# Patient Record
Sex: FEMALE | Race: ASIAN | NOT HISPANIC OR LATINO
[De-identification: names, ages, dates, MRNs, and addresses within clinical notes are randomized per-mention and may not be internally consistent; named-entity substitution may affect disease eponyms.]

---

## 2017-10-30 PROBLEM — Z00.00 ENCOUNTER FOR PREVENTIVE HEALTH EXAMINATION: Status: ACTIVE | Noted: 2017-10-30

## 2017-11-02 ENCOUNTER — APPOINTMENT (OUTPATIENT)
Dept: DERMATOLOGY | Facility: CLINIC | Age: 42
End: 2017-11-02
Payer: COMMERCIAL

## 2017-11-02 VITALS
DIASTOLIC BLOOD PRESSURE: 81 MMHG | BODY MASS INDEX: 28.68 KG/M2 | HEIGHT: 64 IN | SYSTOLIC BLOOD PRESSURE: 114 MMHG | TEMPERATURE: 98.1 F | HEART RATE: 57 BPM | OXYGEN SATURATION: 100 % | WEIGHT: 168 LBS

## 2017-11-02 DIAGNOSIS — Z91.14 PATIENT'S OTHER NONCOMPLIANCE WITH MEDICATION REGIMEN: ICD-10-CM

## 2017-11-02 DIAGNOSIS — L70.0 ACNE VULGARIS: ICD-10-CM

## 2017-11-02 DIAGNOSIS — I78.1 NEVUS, NON-NEOPLASTIC: ICD-10-CM

## 2017-11-02 PROCEDURE — 99203 OFFICE O/P NEW LOW 30 MIN: CPT

## 2017-11-02 RX ORDER — BENZOYL PEROXIDE 5 G/100G
5 LIQUID TOPICAL
Qty: 1 | Refills: 11 | Status: ACTIVE | COMMUNITY
Start: 2017-11-02 | End: 1900-01-01

## 2017-11-02 RX ORDER — CLINDAMYCIN PHOSPHATE 10 MG/ML
1 LOTION TOPICAL
Qty: 1 | Refills: 3 | Status: ACTIVE | COMMUNITY
Start: 2017-11-02 | End: 1900-01-01

## 2017-11-02 RX ORDER — TRETINOIN 0.25 MG/G
0.03 CREAM TOPICAL
Qty: 1 | Refills: 2 | Status: ACTIVE | COMMUNITY
Start: 2017-11-02 | End: 1900-01-01

## 2018-01-04 ENCOUNTER — APPOINTMENT (OUTPATIENT)
Dept: DERMATOLOGY | Facility: CLINIC | Age: 43
End: 2018-01-04

## 2020-04-26 ENCOUNTER — MESSAGE (OUTPATIENT)
Age: 45
End: 2020-04-26

## 2024-03-26 ENCOUNTER — EMERGENCY (EMERGENCY)
Facility: HOSPITAL | Age: 49
LOS: 1 days | Discharge: HOME | End: 2024-03-26
Attending: EMERGENCY MEDICINE | Admitting: STUDENT IN AN ORGANIZED HEALTH CARE EDUCATION/TRAINING PROGRAM
Payer: COMMERCIAL

## 2024-03-26 VITALS
SYSTOLIC BLOOD PRESSURE: 130 MMHG | HEART RATE: 82 BPM | TEMPERATURE: 98 F | RESPIRATION RATE: 16 BRPM | DIASTOLIC BLOOD PRESSURE: 89 MMHG | OXYGEN SATURATION: 98 %

## 2024-03-26 PROBLEM — M25.552 LEFT HIP PAIN: Status: ACTIVE | Noted: 2024-03-26

## 2024-03-26 PROCEDURE — 99223 1ST HOSP IP/OBS HIGH 75: CPT

## 2024-03-26 PROCEDURE — 73502 X-RAY EXAM HIP UNI 2-3 VIEWS: CPT | Mod: 26,LT

## 2024-03-26 RX ORDER — ACETAMINOPHEN 500 MG
325 TABLET ORAL ONCE
Refills: 0 | Status: COMPLETED | OUTPATIENT
Start: 2024-03-26 | End: 2024-03-26

## 2024-03-26 RX ORDER — LIDOCAINE 4 G/100G
1 CREAM TOPICAL ONCE
Refills: 0 | Status: COMPLETED | OUTPATIENT
Start: 2024-03-26 | End: 2024-03-26

## 2024-03-26 RX ORDER — AMLODIPINE BESYLATE 2.5 MG/1
5 TABLET ORAL DAILY
Refills: 0 | Status: DISCONTINUED | OUTPATIENT
Start: 2024-03-26 | End: 2024-03-29

## 2024-03-26 RX ORDER — LIDOCAINE 4 G/100G
1 CREAM TOPICAL DAILY
Refills: 0 | Status: DISCONTINUED | OUTPATIENT
Start: 2024-03-26 | End: 2024-03-29

## 2024-03-26 RX ORDER — DIAZEPAM 5 MG
5 TABLET ORAL ONCE
Refills: 0 | Status: DISCONTINUED | OUTPATIENT
Start: 2024-03-26 | End: 2024-03-26

## 2024-03-26 RX ORDER — OXYCODONE AND ACETAMINOPHEN 5; 325 MG/1; MG/1
2 TABLET ORAL ONCE
Refills: 0 | Status: DISCONTINUED | OUTPATIENT
Start: 2024-03-26 | End: 2024-03-26

## 2024-03-26 RX ORDER — ACETAMINOPHEN 500 MG
650 TABLET ORAL ONCE
Refills: 0 | Status: COMPLETED | OUTPATIENT
Start: 2024-03-26 | End: 2024-03-26

## 2024-03-26 RX ORDER — ACETAMINOPHEN 500 MG
975 TABLET ORAL EVERY 6 HOURS
Refills: 0 | Status: DISCONTINUED | OUTPATIENT
Start: 2024-03-26 | End: 2024-03-29

## 2024-03-26 RX ORDER — MORPHINE SULFATE 50 MG/1
4 CAPSULE, EXTENDED RELEASE ORAL ONCE
Refills: 0 | Status: DISCONTINUED | OUTPATIENT
Start: 2024-03-26 | End: 2024-03-26

## 2024-03-26 RX ORDER — OXYCODONE HYDROCHLORIDE 5 MG/1
5 TABLET ORAL EVERY 6 HOURS
Refills: 0 | Status: DISCONTINUED | OUTPATIENT
Start: 2024-03-26 | End: 2024-03-27

## 2024-03-26 RX ADMIN — OXYCODONE HYDROCHLORIDE 5 MILLIGRAM(S): 5 TABLET ORAL at 16:13

## 2024-03-26 RX ADMIN — LIDOCAINE 1 PATCH: 4 CREAM TOPICAL at 19:00

## 2024-03-26 RX ADMIN — Medication 650 MILLIGRAM(S): at 10:07

## 2024-03-26 RX ADMIN — Medication 650 MILLIGRAM(S): at 11:07

## 2024-03-26 RX ADMIN — Medication 325 MILLIGRAM(S): at 10:07

## 2024-03-26 RX ADMIN — LIDOCAINE 1 PATCH: 4 CREAM TOPICAL at 12:01

## 2024-03-26 RX ADMIN — OXYCODONE HYDROCHLORIDE 5 MILLIGRAM(S): 5 TABLET ORAL at 17:39

## 2024-03-26 RX ADMIN — MORPHINE SULFATE 4 MILLIGRAM(S): 50 CAPSULE, EXTENDED RELEASE ORAL at 11:18

## 2024-03-26 RX ADMIN — MORPHINE SULFATE 4 MILLIGRAM(S): 50 CAPSULE, EXTENDED RELEASE ORAL at 12:00

## 2024-03-26 RX ADMIN — MORPHINE SULFATE 4 MILLIGRAM(S): 50 CAPSULE, EXTENDED RELEASE ORAL at 09:29

## 2024-03-26 RX ADMIN — Medication 325 MILLIGRAM(S): at 11:07

## 2024-03-26 RX ADMIN — Medication 5 MILLIGRAM(S): at 19:21

## 2024-03-26 NOTE — ED ADULT NURSE NOTE - CHIEF COMPLAINT QUOTE
patient states " I am having pain in my left hip since last Wednesday and I was told I have pinched nerve and I am taking prednisone and Flexeril since yesterday with no relief "

## 2024-03-26 NOTE — ED CDU PROVIDER INITIAL DAY NOTE - CLINICAL SUMMARY MEDICAL DECISION MAKING FREE TEXT BOX
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  Patient has taken some prednisone and Flexeril from her PMD without relief.  No urinary symptoms.  Patient is able to walk but with pain. Has an appointment with spine ( ortho) Dr. Cagle on Thursday 03/28/24 @ 11 AM. Pt placed in CDU for pain control. Pt refusing nsaid as > 20 years ago after taking ibuprofen experienced tachycardia/ palpitations.

## 2024-03-26 NOTE — ED CDU PROVIDER INITIAL DAY NOTE - ATTENDING APP SHARED VISIT CONTRIBUTION OF CARE
I performed a face to face evaluation of this patient and obtained a history and performed a full exam.  I agree with the history, physical exam and plan of the PA.  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  Patient has taken some prednisone and Flexeril from her PMD without relief.  No urinary symptoms.  Patient is able to walk but with pain. Has an appointment with spine ( ortho) Dr. Cagle on Thursday 03/28/24 @ 11 AM. Pt placed in CDU for pain control. Pt refusing nsaid as > 20 years ago after taking ibuprofen experienced tachycardia/ palpitations.    Pt with FROM hip and low back no cvat, no midline, neuro wnl.   Able to walk with pain.  Skin wnl.  For pain control and reassess and CDU for pain control

## 2024-03-26 NOTE — ED ADULT NURSE NOTE - OBJECTIVE STATEMENT
Pt received to intake presents with generalized back pain. Pt a&ox4, ambulatory at baseline, skin intact, respirations even and unlabored, abd soft and non-distended, nontender to palpation. Denies fever, chills, urinary incontinence. Pt to be medicated as per ordered.

## 2024-03-26 NOTE — ED PROVIDER NOTE - RAPID ASSESSMENT
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  Patient has taken some prednisone and Flexeril from her PMD without relief.  No urinary symptoms.  Patient is able to walk but with pain.

## 2024-03-26 NOTE — ED ADULT NURSE REASSESSMENT NOTE - NS ED NURSE REASSESS COMMENT FT1
patient Alert & ox4. medicated with morphine and lidocaine patch for pain as per order. Placed 20g angiocath rt AC.,  patient will be waiting for  further evaluation and disposition.  made comfortable.will continue to monitor.

## 2024-03-27 VITALS
SYSTOLIC BLOOD PRESSURE: 134 MMHG | RESPIRATION RATE: 18 BRPM | DIASTOLIC BLOOD PRESSURE: 75 MMHG | HEART RATE: 69 BPM | OXYGEN SATURATION: 97 % | TEMPERATURE: 99 F

## 2024-03-27 PROCEDURE — 99239 HOSP IP/OBS DSCHRG MGMT >30: CPT

## 2024-03-27 RX ORDER — KETOROLAC TROMETHAMINE 30 MG/ML
15 SYRINGE (ML) INJECTION ONCE
Refills: 0 | Status: DISCONTINUED | OUTPATIENT
Start: 2024-03-27 | End: 2024-03-27

## 2024-03-27 RX ORDER — DIAZEPAM 5 MG
5 TABLET ORAL ONCE
Refills: 0 | Status: DISCONTINUED | OUTPATIENT
Start: 2024-03-27 | End: 2024-03-27

## 2024-03-27 RX ORDER — LIDOCAINE 4 G/100G
1 CREAM TOPICAL ONCE
Refills: 0 | Status: COMPLETED | OUTPATIENT
Start: 2024-03-27 | End: 2024-03-27

## 2024-03-27 RX ORDER — DIAZEPAM 5 MG
1 TABLET ORAL
Qty: 3 | Refills: 0
Start: 2024-03-27

## 2024-03-27 RX ADMIN — OXYCODONE HYDROCHLORIDE 5 MILLIGRAM(S): 5 TABLET ORAL at 02:21

## 2024-03-27 RX ADMIN — OXYCODONE HYDROCHLORIDE 5 MILLIGRAM(S): 5 TABLET ORAL at 01:21

## 2024-03-27 RX ADMIN — LIDOCAINE 1 PATCH: 4 CREAM TOPICAL at 07:54

## 2024-03-27 RX ADMIN — Medication 15 MILLIGRAM(S): at 11:26

## 2024-03-27 RX ADMIN — Medication 500 MILLIGRAM(S): at 12:29

## 2024-03-27 RX ADMIN — Medication 15 MILLIGRAM(S): at 10:24

## 2024-03-27 RX ADMIN — Medication 500 MILLIGRAM(S): at 14:00

## 2024-03-27 RX ADMIN — OXYCODONE HYDROCHLORIDE 5 MILLIGRAM(S): 5 TABLET ORAL at 07:58

## 2024-03-27 RX ADMIN — Medication 5 MILLIGRAM(S): at 10:25

## 2024-03-27 RX ADMIN — LIDOCAINE 1 PATCH: 4 CREAM TOPICAL at 10:25

## 2024-03-27 RX ADMIN — LIDOCAINE 1 PATCH: 4 CREAM TOPICAL at 10:30

## 2024-03-27 NOTE — ED CDU PROVIDER DISPOSITION NOTE - CLINICAL COURSE
47 yo F with no significant PMH who presented to ED with left lower back pain x 1 week after heavy lifting, reports no improvement with Prednisone or Flexeril. Pt has upcoming appt with spine on Thursday.   Pt placed in CDU for pain control.  In the interim, pt reports pain is somewhat improving, able to ambulate.  Patient reassessed, sitting comfortably in bed in NAD, denies any complaints. States feeling better, symptoms improved. Pt is ambulatory. Pt is medically stable for discharge and follow up with PMD and ortho spine.

## 2024-03-27 NOTE — ED CDU PROVIDER DISPOSITION NOTE - NSFOLLOWUPINSTRUCTIONS_ED_ALL_ED_FT
Rest, drink plenty of fluids.  Advance activity as tolerated.  Continue all previously prescribed medications as directed.  Follow up with your primary care physician and orthopedic spine doctor in 48-72 hours- bring copies of your results.  Return to the ER for worsening or persistent symptoms, and/or ANY NEW OR CONCERNING SYMPTOMS. If you have issues obtaining follow up, please call: [phone] to obtain a doctor or specialist who takes your insurance in your area.     Take Naproxen 500mg two times a day (every 12 hours) as needed for pain - take with food.   Take Valium 5 mg every 8 hours as needed for muscle spasm -- causes drowsiness; DO NOT drink alcohol, drive, or operate heavy machinery with this medication.   Apply warm compress to affected area for 15 minutes, 3-4 times per day.

## 2024-03-27 NOTE — ED CDU PROVIDER SUBSEQUENT DAY NOTE - ATTENDING APP SHARED VISIT CONTRIBUTION OF CARE
48-year-old female no significant past medical history with left lower back pain/hip for approximately 1 week.  Onset of symptoms after heavy lifting.  She denies numbness, weakness, bladder or bowel incontinence.  Denies fevers chills, drug use. Patient has been taking prednisone and Flexeril at home without significant relief.  Has upcoming appointment with spine specialist.  Of note patient reports avoiding Motrin after episode of palpitations when she took Motrin 25 years ago.  She denies any rash, itchiness, difficulty breathing.  Patient reports tolerating p.o. and ambulating to bathroom since in ED.  Well-appearing no acute distress.  Pupils equal and reactive to light, extract movements intact.  Full range of motion of neck.  S1-S2 alert rhythm.  Lungs clear to auscultation.  Abdomen soft nontender.  Awake alert oriented x 3, sensation intact in all 4 extremities, motor 5 out of 5 in all 4 extremities.  No midline or paraspinal tenderness.  Mild left lower gluteal tenderness.  No extremity edema.  Extremity tenderness.  Normal straight leg raise testing.  2+ pulses in all 4 extremities.  Possible radiculopathy.  Given normal straight leg raise lower suspicion for disc pathology.  X-ray reviewed.  Describes symptoms to Motrin unlikely to be true NSAID allergy.  Patient trialed with Toradol without reaction and then tolerated Aleve without reaction.  Patient improved while in CDU and discharged with strict return precautions.  Patient has already scheduled outpatient follow-up with spine surgeon.

## 2024-03-27 NOTE — ED CDU PROVIDER DISPOSITION NOTE - PATIENT PORTAL LINK FT
You can access the FollowMyHealth Patient Portal offered by [facility] by registering at the following website: [email or web address]. By joining [facility]’s FollowMyHealth portal, you will also be able to view your health information using other applications (apps) compatible with our system.

## 2024-03-27 NOTE — ED CDU PROVIDER SUBSEQUENT DAY NOTE - HISTORY
Pt is a 49 YO F with no significant PMH who presented to ED with left lower back pain x 1 week after heavy lifting, reports no improvement with Prednisone or Flexeril. Pt has upcoming appt with spine on Thursday.   Pt placed in CDU for pain control.  In the interim, pt reports pain is somewhat improving, able to ambulate.

## 2024-03-27 NOTE — ED CDU PROVIDER SUBSEQUENT DAY NOTE - CLINICAL SUMMARY MEDICAL DECISION MAKING FREE TEXT BOX
Pt is a 49 YO F with no significant PMH who presented to ED with left lower back pain x 1 week after heavy lifting, reports no improvement with Prednisone or Flexeril. Pt has upcoming appt with spine on Thursday.   Pt placed in CDU for pain control.

## 2024-03-28 ENCOUNTER — APPOINTMENT (OUTPATIENT)
Dept: ORTHOPEDIC SURGERY | Facility: CLINIC | Age: 49
End: 2024-03-28
Payer: COMMERCIAL

## 2024-03-28 VITALS
SYSTOLIC BLOOD PRESSURE: 113 MMHG | WEIGHT: 175 LBS | DIASTOLIC BLOOD PRESSURE: 80 MMHG | BODY MASS INDEX: 29.88 KG/M2 | HEIGHT: 64 IN | HEART RATE: 75 BPM | OXYGEN SATURATION: 97 %

## 2024-03-28 DIAGNOSIS — M25.552 PAIN IN LEFT HIP: ICD-10-CM

## 2024-03-28 PROBLEM — I10 ESSENTIAL (PRIMARY) HYPERTENSION: Chronic | Status: ACTIVE | Noted: 2024-03-26

## 2024-03-28 PROCEDURE — 73502 X-RAY EXAM HIP UNI 2-3 VIEWS: CPT

## 2024-03-28 PROCEDURE — 72100 X-RAY EXAM L-S SPINE 2/3 VWS: CPT

## 2024-03-28 PROCEDURE — 99203 OFFICE O/P NEW LOW 30 MIN: CPT | Mod: 25

## 2024-03-29 ENCOUNTER — APPOINTMENT (OUTPATIENT)
Dept: ORTHOPEDIC SURGERY | Facility: CLINIC | Age: 49
End: 2024-03-29
Payer: COMMERCIAL

## 2024-03-29 VITALS
HEART RATE: 71 BPM | SYSTOLIC BLOOD PRESSURE: 130 MMHG | HEIGHT: 64 IN | WEIGHT: 175 LBS | BODY MASS INDEX: 29.88 KG/M2 | DIASTOLIC BLOOD PRESSURE: 87 MMHG

## 2024-03-29 DIAGNOSIS — M54.50 LOW BACK PAIN, UNSPECIFIED: ICD-10-CM

## 2024-03-29 PROCEDURE — 99214 OFFICE O/P EST MOD 30 MIN: CPT

## 2024-03-29 RX ORDER — DICLOFENAC SODIUM 75 MG/1
75 TABLET, DELAYED RELEASE ORAL
Qty: 60 | Refills: 0 | Status: ACTIVE | COMMUNITY
Start: 2024-03-29 | End: 1900-01-01

## 2024-03-29 RX ORDER — TIZANIDINE 2 MG/1
2 TABLET ORAL
Qty: 24 | Refills: 0 | Status: ACTIVE | COMMUNITY
Start: 2024-03-29 | End: 1900-01-01

## 2024-03-29 NOTE — PHYSICAL EXAM
[de-identified] : Lumbar Physical Exam   Gait - Normal  Station - Normal   Sagittal balance - Normal   Compensatory mechanism? - None   Heel walk - Normal   Toe walk - Normal     Reflexes    Patellar - normal    Gastroc - normal    Clonus - No    Hip Exam - Normal   Straight leg raise - none   Pulses - 2+ dp/pt   Range of motion - normal    Sensation   Sensation intact to light touch in L1, L2, L3, L4, L5 and S1 dermatomes bilaterally     Motor             IP Quad HS TA Gastroc EHL   Right 5/5  5/5   5/5 5/5    5/5     5/5    Left   5/5 5/5 5/5 5/5    5/5      5/5  [de-identified] : Lumbar Rads 2 views  Facet Arthropathy

## 2024-03-29 NOTE — ADDENDUM
[FreeTextEntry1] :  I, Kelly Winston, acted solely as a scribe for Dr. Rufino Edwards MD on this date 03/29/2024   All medical record entries made by the Scribe were at my, Dr. Rufino Edwards MD., direction and personally dictated by me on 03/29/2024. I have reviewed the chart and agree that the record accurately reflects my personal performance of the history, physical exam, assessment and plan. I have also personally directed, reviewed, and agreed with the chart.

## 2024-03-29 NOTE — HISTORY OF PRESENT ILLNESS
[de-identified] : Patient is a 48-year-old female who presents for initial eval of acute lower back pain. Denies any radiating LE sxs. Followed up to ED d/t pain when walking and sitting. Reports limited activity. Taking naproxen and Valium for pain relief.

## 2024-03-29 NOTE — ASSESSMENT
[FreeTextEntry1] : I had a lengthy discussion with the patient in regard to treatment plan and diagnosis. There are no red flag findings on imaging nor are there any red flag findings on clinical exams. Therefore, we will proceed with a course of conservative treatment. This would include physical therapy/home exercise program, Diclofenac, Tizanidine, Tylenol, NSAIDs as medically indicated. The patient will follow up with me in approximately 4 weeks. I encouraged the patient to follow-up sooner if there are any new or worsening symptoms.

## 2024-03-30 NOTE — HISTORY OF PRESENT ILLNESS
[de-identified] : Samantha Grant is a 48-year-old female presents to the office for evaluation of her lower back pain.  Patient has experienced left lower back pain since last Wednesday.  Pain worsened on Monday.  She had difficulty sitting, standing, and walking.  Patient went to the [facility] ER and was admitted to the CDU.  She was treated with ketorolac and diazepam.  Patient was discharged with naproxen and Valium.  She continues to have pain.  No radiation of the pain.  There were no falls.  Patient did pull a drawsheet for patient, which possibly caused the pain.  No numbness or tingling.  No bowel or bladder changes.  No reported saddle anesthesia.  History: HTN

## 2024-03-30 NOTE — DISCUSSION/SUMMARY
[de-identified] : Samantha Grant is aIs a 40-year-old female presents the office for evaluation of her lower back pain.  Patient is experienced left low back pain since last Wednesday.  Pain worsened on Monday.  X-rays showed no obvious fractures or dislocations.  Examination showed a positive straight leg raise, but otherwise good hip range of motion.  Discussed with patient the examination and imaging findings.  Discussed with patient that her pain is likely secondary to her lumbar spine.  Discussed with patient the management of her pain at this time, including physical therapy and anti-inflammatories.  Patient scheduled for physical therapy.  She will take over-the-counter anti-inflammatories as needed for pain control.  Discussed that it be beneficial to undergo further evaluation by spine specialist.  Patient was given a referral to Dr. Edwards.  Patient will follow-up in 2 months for reevaluation and management of her left hip.  Patient understanding and in agreement with the plan.  All questions answered.   Plan: -Physical therapy -Over-the-counter anti-inflammatories as needed for pain control -Follow-up with Dr. Edwards -Follow-up in 2 months for reevaluation and management

## 2024-03-30 NOTE — PHYSICAL EXAM
[de-identified] : Constitutional:  48 year old female, alert and oriented, cooperative, in no acute distress.  HEENT  NC/AT.  Appearance: symmetric  Neck/Back Straight without deformity or instability.  Good ROM.  Chest/Respiratory  Respiratory effort: no intercostal retractions or use of accessory muscles. Nonlabored Breathing  Mental Status:  Judgment, insight: intact Orientation: oriented to time, place, and person  Neurological: Sensory and Motor are grossly intact throughout  Left Hip Exam:  Tenderness:  	Sacroiliac: Negative  	Greater trochanter: Negative                   JOSH Test: Negative                  FADIR Test: Negative   Range of Motion:                 Extension - 0  	Flexion - 100  	IR - 30  	ER - 45  	Abd - 45  	Add - 30   Neurologic Exam     Motor intact including 5/5 Extensor Hallucis Longus, 5/5 Flexor Hallucis Longus, 5/5 Tibialis Anterior and 5/5 Gastrocnemius     Sensation Intact to Light Touch including Saphenous, Sural, Superficial Peroneal, Deep Peroneal, Tibial nerve distributions  Vascular Exam     Foot is warm and well perfused with 2+ Dorsalis Pedis Pulse  No pain with range of motion of the right hip or bilateral knees.  Spine Exam: No midline Tenderness to Palpation over the Lumbar spine No paraspinal muscle Tenderness to Palpation Positive left Straight Leg Raise  Motor:                 L2               L3               L4               L5               S1 R            5/5              5/5              5/5               5/5              5/5 L            5/5              5/5              5/5               5/5              5/5  Sensory:                L2          L3          L4           L5          S1        (0=absent, 1=impaired, 2=normal, NT=not testable) R              2            2             2            2            2 L              2            2             2            2            2 [de-identified] : XRay: XRays of the Pelvis (1 View) and Left Hip (2 Views) taken in the office today and discussed with the patient. XRays demonstrate joint space narrowing in the hip joint with subchondral sclerosis, consistent with mild osteoarthritis, Tonnis Grade: 1. (my personal interpretation)   XRay:  XRays of the Lumbar Spine (2 Views) taken in the office today and discussed with the patient. XRays demonstrate no obvious fracture or dislocation. (my personal interpretation).    ACC: [id number] EXAM: XR HIP WITH PELV 2-3V LT ORDERED BY: ELLA ALEJANDRO  PROCEDURE DATE: 03/26/2024    INTERPRETATION: CLINICAL INDICATION: pelvic and hip pain  EXAM: AP pelvis with AP and frog-lateral left hip from 3/26/2024 at 1008. No similar prior studies available for comparison.  IMPRESSION: No hip fractures or dislocations.  Intact pelvic and obturator rings and symmetrically aligned and spaced SI joints.  Slightly hypertrophic pubic symphysis degenerative change with subarticular cystic changes along parasymphyseal margins. Slightly hypertrophic rim osteophytes along bilateral peripheral acetabular articular margins otherwise preserved bilateral hip joint spaces. No gross radiographic evidence for AVN.  No destructive osseous lesions or periosteal reaction.  T-shaped pelvic IUD present.  --- End of Report ---      PHILLIP GROSSMAN MD; Attending Radiologist This document has been electronically signed. Mar 26 2024 11:58AM

## 2024-04-26 ENCOUNTER — APPOINTMENT (OUTPATIENT)
Dept: ORTHOPEDIC SURGERY | Facility: CLINIC | Age: 49
End: 2024-04-26

## 2024-05-30 ENCOUNTER — APPOINTMENT (OUTPATIENT)
Dept: ORTHOPEDIC SURGERY | Facility: CLINIC | Age: 49
End: 2024-05-30

## 2025-01-17 NOTE — ED CDU PROVIDER INITIAL DAY NOTE - CROS ED ENMT ALL NEG
Creatine stable for now. BMP reviewed- noted Estimated Creatinine Clearance: 21.3 mL/min (A) (based on SCr of 3.2 mg/dL (H)). according to latest data. Based on current GFR, CKD stage is stage 3 - GFR 30-59.  Monitor UOP and serial BMP and adjust therapy as needed. Renally dose meds. Avoid nephrotoxic medications and procedures.   negative...